# Patient Record
Sex: MALE | URBAN - METROPOLITAN AREA
[De-identification: names, ages, dates, MRNs, and addresses within clinical notes are randomized per-mention and may not be internally consistent; named-entity substitution may affect disease eponyms.]

---

## 2024-05-13 ENCOUNTER — TELEPHONE (OUTPATIENT)
Dept: PULMONOLOGY | Age: 74
End: 2024-05-13

## 2024-05-13 NOTE — TELEPHONE ENCOUNTER
I need to see Damon Cesia [birth year] as a new referral for pulmonary fibrosis.  He does not have an EPIC chart that I see so it needs to be created.  Can add on May 29th or May 30th at 1 pm depending on what day he wants to come in.  [phone]    Appt made 5-30 at 1 pm  Pt notified  Request faxed to [employer]

## 2024-05-20 ENCOUNTER — OFFICE VISIT (OUTPATIENT)
Dept: PULMONOLOGY | Age: 74
End: 2024-05-20
Payer: COMMERCIAL

## 2024-05-20 VITALS
OXYGEN SATURATION: 90 % | BODY MASS INDEX: 24.95 KG/M2 | WEIGHT: 184.2 LBS | TEMPERATURE: 98.2 F | HEART RATE: 82 BPM | RESPIRATION RATE: 20 BRPM | SYSTOLIC BLOOD PRESSURE: 110 MMHG | DIASTOLIC BLOOD PRESSURE: 80 MMHG | HEIGHT: 72 IN

## 2024-05-20 DIAGNOSIS — J96.11 CHRONIC RESPIRATORY FAILURE WITH HYPOXIA (HCC): ICD-10-CM

## 2024-05-20 DIAGNOSIS — J84.9 ILD (INTERSTITIAL LUNG DISEASE) (HCC): Primary | ICD-10-CM

## 2024-05-20 PROCEDURE — 99204 OFFICE O/P NEW MOD 45 MIN: CPT | Performed by: INTERNAL MEDICINE

## 2024-05-20 PROCEDURE — 1123F ACP DISCUSS/DSCN MKR DOCD: CPT | Performed by: INTERNAL MEDICINE

## 2024-05-20 RX ORDER — PREDNISONE 20 MG/1
TABLET ORAL
COMMUNITY
Start: 2024-05-16

## 2024-05-20 NOTE — PROGRESS NOTES
PATIENT IS SEEN AT THE REQUEST OF DR. Marley for ILD, Pulmonary Fibrosis     Chief complaint  This is a 73 y.o. year old male  who comes to see me with a chief complaint of   Chief Complaint   Patient presents with    New Patient     Breathing issues       HPI  Here with a cc of SOB    Start about 2-2.5 years ago after having COVID    He did notice some SOB/limitations when working etc.      Got COVID again in January and seemed to recover.  Then he get SOB again and is SOB with any exertion.  He seems to get SOB when walking short distances.  He saw Pulm at [facility] but was somewhat disappointed about his visit, lack of information and a slew of blood tests ordered with no results.  He was set up with oxygen but not portable oxygen. Says he can desaturate into the 70's with exertion.  Oxygen does not seem to help.  He was on steroids for brief time with help and he's started 40 mg of prednisone last week.  This has helped him too.  He is not back to normal but a little bit better.  He is struggling to breathe and frustrated.  Scheduled for PFT this Friday.  Last CT was 9/23        Occupation:  Advertising     Pets: Dog    Hobbies/Exposures: None    TB Exposure:  None    Lung Procedures:  None      Past medical history:  Prostate Cancer     No past surgical history on file.    Social History     Socioeconomic History    Marital status: Unknown     Spouse name: Not on file    Number of children: Not on file    Years of education: Not on file    Highest education level: Not on file   Occupational History    Not on file   Tobacco Use    Smoking status: Never    Smokeless tobacco: Never   Substance and Sexual Activity    Alcohol use: Not on file    Drug use: Not on file    Sexual activity: Not on file   Other Topics Concern    Not on file   Social History Narrative    Not on file     Social Determinants of Health     Financial Resource Strain: Not on file   Food Insecurity: Not on file   Transportation Needs: Not on

## 2024-05-20 NOTE — PATIENT INSTRUCTIONS
Prednisone 40 mg for 2-3 weeks, then decrease to 20 mg     Complete PFT already scheduled    CT Chest    6 minute walk test     Follow up in 3-4 weeks

## 2024-05-28 ENCOUNTER — HOSPITAL ENCOUNTER (OUTPATIENT)
Dept: CT IMAGING | Age: 74
Discharge: HOME | End: 2024-05-28
Attending: INTERNAL MEDICINE
Payer: COMMERCIAL

## 2024-05-28 DIAGNOSIS — J84.9 ILD (INTERSTITIAL LUNG DISEASE) (HCC): ICD-10-CM

## 2024-05-28 PROCEDURE — 71250 CT THORAX DX C-: CPT

## 2024-05-29 ENCOUNTER — TELEPHONE (OUTPATIENT)
Dept: PULMONOLOGY | Age: 74
End: 2024-05-29